# Patient Record
Sex: MALE | Race: OTHER | NOT HISPANIC OR LATINO
[De-identification: names, ages, dates, MRNs, and addresses within clinical notes are randomized per-mention and may not be internally consistent; named-entity substitution may affect disease eponyms.]

---

## 2021-10-04 PROBLEM — Z00.00 ENCOUNTER FOR PREVENTIVE HEALTH EXAMINATION: Status: ACTIVE | Noted: 2021-10-04

## 2021-10-05 ENCOUNTER — APPOINTMENT (OUTPATIENT)
Dept: OTOLARYNGOLOGY | Facility: CLINIC | Age: 71
End: 2021-10-05
Payer: MEDICARE

## 2021-10-05 ENCOUNTER — NON-APPOINTMENT (OUTPATIENT)
Age: 71
End: 2021-10-05

## 2021-10-05 DIAGNOSIS — Z87.898 PERSONAL HISTORY OF OTHER SPECIFIED CONDITIONS: ICD-10-CM

## 2021-10-05 DIAGNOSIS — Z82.49 FAMILY HISTORY OF ISCHEMIC HEART DISEASE AND OTHER DISEASES OF THE CIRCULATORY SYSTEM: ICD-10-CM

## 2021-10-05 DIAGNOSIS — Z86.69 PERSONAL HISTORY OF OTHER DISEASES OF THE NERVOUS SYSTEM AND SENSE ORGANS: ICD-10-CM

## 2021-10-05 DIAGNOSIS — Z86.39 PERSONAL HISTORY OF OTHER ENDOCRINE, NUTRITIONAL AND METABOLIC DISEASE: ICD-10-CM

## 2021-10-05 DIAGNOSIS — Z78.9 OTHER SPECIFIED HEALTH STATUS: ICD-10-CM

## 2021-10-05 DIAGNOSIS — H60.501 UNSPECIFIED ACUTE NONINFECTIVE OTITIS EXTERNA, RIGHT EAR: ICD-10-CM

## 2021-10-05 PROCEDURE — 92557 COMPREHENSIVE HEARING TEST: CPT

## 2021-10-05 PROCEDURE — 92567 TYMPANOMETRY: CPT

## 2021-10-05 PROCEDURE — G0268 REMOVAL OF IMPACTED WAX MD: CPT

## 2021-10-05 PROCEDURE — 99203 OFFICE O/P NEW LOW 30 MIN: CPT | Mod: 25

## 2021-10-05 RX ORDER — OMEPRAZOLE 40 MG/1
CAPSULE, DELAYED RELEASE ORAL
Refills: 0 | Status: ACTIVE | COMMUNITY

## 2021-10-05 RX ORDER — NEOMYCIN AND POLYMYXIN B SULFATES AND HYDROCORTISONE OTIC 10; 3.5; 1 MG/ML; MG/ML; [USP'U]/ML
3.5-10000-1 SUSPENSION AURICULAR (OTIC) TWICE DAILY
Qty: 1 | Refills: 3 | Status: ACTIVE | COMMUNITY
Start: 2021-10-05 | End: 1900-01-01

## 2021-10-05 RX ORDER — SIMVASTATIN 40 MG/1
TABLET, FILM COATED ORAL
Refills: 0 | Status: ACTIVE | COMMUNITY

## 2021-10-05 RX ORDER — ALLOPURINOL 200 MG/1
TABLET ORAL
Refills: 0 | Status: ACTIVE | COMMUNITY

## 2021-10-05 RX ORDER — ENALAPRIL MALEATE 5 MG/1
TABLET ORAL
Refills: 0 | Status: ACTIVE | COMMUNITY

## 2021-10-05 NOTE — HISTORY OF PRESENT ILLNESS
[de-identified] : DESIRE MARKS is a 71 year old male who comes in complaining of Diminished hearing and otalgia in the right ear for several days.He had used drops to try to get out wax. He uses hearing aids in both ears and he finds they are not as helpful and he would like.  He has a history of sleep apnea and uses a CPAP successfully.  The patient had no other ear nose or throat complaints at this visit.

## 2021-10-05 NOTE — ASSESSMENT
[FreeTextEntry1] : It was my impression that the patient had a cerumen impaction that was cleared.  I recommended topical moisturizing.  I recommended avoiding Q-tips.  I reviewed aural hygiene and the role of cerumen with the patient.  I suggested a repeat visit in a year or earlier should the need arise.\par He has a secondary right-sided acute otitis media and I recommended a course of Cortisporin and keeping the ear dry.\par I suggested having his hearing aids evaluated and adjusted as needed.  He is concerned about missing the high-frequency background noises- especially for his fly fishing and  probably would be a good candidate for the ear lens if desired.  this was discussed.

## 2021-10-05 NOTE — CONSULT LETTER
[FreeTextEntry2] : CALVIN HERR\par  [FreeTextEntry1] : \par \par Dear  Dr. CALVIN HERR,\par \par I had the pleasure of seeing your patient today.  \par Please see my note below.\par \par \par Thank you very much for allowing me to participate in the care of your patient.\par \par Sincerely,\par  \par Raheem\par \par \par \par Alex Mooney MD\par [facility]\par [facility]\par  [facility]\par \par

## 2021-10-05 NOTE — PHYSICAL EXAM
[FreeTextEntry1] : \par The patient was alert and oriented and in no distress.\par Voice was clear.\par \par Face:\par The patient had no facial asymmetry or mass.\par The skin was unremarkable other than for a 1 cm right mid lower skin lesion with each central ulceration\par \par Eyes:\par The pupils were equal round and reactive to light and accommodation.\par There was no significant nystagmus or disconjugate gaze noted.\par \par Nose: \par The external nose had no significant deformity.  There was no facial tenderness.  On anterior rhinoscopy, the nasal mucosa was clear.  The anterior septum was midline.  There were no visualized polyps purulence  or masses.\par \par Oral cavity:\par The oral mucosa was normal.\par The oral and base of tongue were clear and without mass.\par The gingival and buccal mucosa were moist and without lesions.\par The palate moved well.\par There was no cleft to the palate.\par There appeared to be good salivary flow.  \par There was no pus, erythema or mass in the oral cavity.\par He had a small posterior airway\par \par Ears:\par  Procedure note:\par  Removal of Cerumen Impactions, both ears:  79090-08\par Both external ears were normal.\par There were symptomatic cerumen impactions in both ears.  These were cleared microscopically without trauma using both suction and curettes.  After clearing,  both ear canals were clear both eardrums were intact and mobile.  \par The right canal was almost hemorrhagic.\par Neck: \par The neck was symmetrical.\par The parotid and submandibular glands were normal without masses.\par The trachea was midline and there was no unusual crepitus.\par The thyroid was smooth and nontender and no masses were palpated.\par There was no significant cervical adenopathy.\par \par \par Neuro:\par Neurologically, the patient was awake, alert, and oriented to person, place and time. There were no obvious focal neurologic abnormalities.  Cranial nerves II through XII were grossly intact.\par \par \par TMJ:\par The temporomandibular joints were nontender.\par There was no abnormal crepitus and no significant malocclusion\par \par  [de-identified] : Audiogram:\par \par Audiometry showed that both ears had normal hearing through 500 hz with high pitched symmetric sensorineural hearing losses above that.  Reflexes were intact and the patient had type A tympanograms.  The audiogram was reviewed with the patient.

## 2022-04-06 ENCOUNTER — APPOINTMENT (OUTPATIENT)
Dept: OTOLARYNGOLOGY | Facility: CLINIC | Age: 72
End: 2022-04-06

## 2022-04-09 ENCOUNTER — OUTPATIENT (OUTPATIENT)
Dept: OUTPATIENT SERVICES | Facility: HOSPITAL | Age: 72
LOS: 1 days | End: 2022-04-09

## 2022-04-09 ENCOUNTER — APPOINTMENT (OUTPATIENT)
Dept: CT IMAGING | Facility: CLINIC | Age: 72
End: 2022-04-09
Payer: MEDICARE

## 2022-04-09 PROCEDURE — 73700 CT LOWER EXTREMITY W/O DYE: CPT | Mod: 26,LT,MH

## 2022-11-09 ENCOUNTER — OUTPATIENT (OUTPATIENT)
Dept: OUTPATIENT SERVICES | Facility: HOSPITAL | Age: 72
LOS: 1 days | End: 2022-11-09
Payer: MEDICARE

## 2022-11-09 ENCOUNTER — APPOINTMENT (OUTPATIENT)
Dept: NUCLEAR MEDICINE | Facility: HOSPITAL | Age: 72
End: 2022-11-09

## 2022-11-09 ENCOUNTER — RESULT REVIEW (OUTPATIENT)
Age: 72
End: 2022-11-09

## 2022-11-09 PROCEDURE — 78315 BONE IMAGING 3 PHASE: CPT | Mod: 26,MH

## 2022-11-09 PROCEDURE — A9503: CPT

## 2022-11-09 PROCEDURE — 78315 BONE IMAGING 3 PHASE: CPT

## 2022-12-19 ENCOUNTER — APPOINTMENT (OUTPATIENT)
Dept: MRI IMAGING | Facility: CLINIC | Age: 72
End: 2022-12-19

## 2022-12-19 ENCOUNTER — RESULT REVIEW (OUTPATIENT)
Age: 72
End: 2022-12-19

## 2022-12-19 ENCOUNTER — OUTPATIENT (OUTPATIENT)
Dept: OUTPATIENT SERVICES | Facility: HOSPITAL | Age: 72
LOS: 1 days | End: 2022-12-19

## 2022-12-19 PROCEDURE — 73721 MRI JNT OF LWR EXTRE W/O DYE: CPT | Mod: 26,LT,MH

## 2023-12-21 ENCOUNTER — APPOINTMENT (OUTPATIENT)
Dept: OTOLARYNGOLOGY | Facility: CLINIC | Age: 73
End: 2023-12-21
Payer: MEDICARE

## 2023-12-21 DIAGNOSIS — H61.23 IMPACTED CERUMEN, BILATERAL: ICD-10-CM

## 2023-12-21 DIAGNOSIS — G47.30 SLEEP APNEA, UNSPECIFIED: ICD-10-CM

## 2023-12-21 DIAGNOSIS — H90.3 SENSORINEURAL HEARING LOSS, BILATERAL: ICD-10-CM

## 2023-12-21 PROCEDURE — 92567 TYMPANOMETRY: CPT

## 2023-12-21 PROCEDURE — V5011: CPT | Mod: GY

## 2023-12-21 PROCEDURE — G0268 REMOVAL OF IMPACTED WAX MD: CPT

## 2023-12-21 PROCEDURE — 99213 OFFICE O/P EST LOW 20 MIN: CPT | Mod: 25

## 2023-12-21 PROCEDURE — 92557 COMPREHENSIVE HEARING TEST: CPT

## 2023-12-21 NOTE — ASSESSMENT
[FreeTextEntry1] : It was my impression that the patient had a cerumen impaction that was cleared.  I recommended topical moisturizing.  I recommended avoiding Q-tips.  I reviewed aural hygiene and the role of cerumen with the patient.  I suggested a repeat visit in a year or earlier should the need arise. He felt that this improved his hearing already  He has the significant sensorineural hearing losses but based on the configuration he should be able to get a good hearing aid result and this was discussed.  I would first try to reprogram his old aids if they are in good enough repair to use

## 2023-12-21 NOTE — CONSULT LETTER
[FreeTextEntry2] : CALVIN HERR [FreeTextEntry1] :   Dear  Dr. CALVIN HERR,  I had the pleasure of seeing your patient today.   Please see my note below.   Thank you very much for allowing me to participate in the care of your patient.  Sincerely,  Raheem Mooney MD [facility]  [facility]

## 2023-12-21 NOTE — PHYSICAL EXAM
[FreeTextEntry1] :  The patient was alert and oriented and in no distress. Voice was clear.  Face: The patient had no facial asymmetry or mass. The skin was unremarkable.  Eyes: The pupils were equal round and reactive to light and accommodation. There was no significant nystagmus or disconjugate gaze noted.  Nose:  The external nose had no significant deformity.  There was no facial tenderness.  On anterior rhinoscopy, the nasal mucosa was clear.  The anterior septum was midline.  There were no visualized polyps purulence  or masses.  He had a skin graft over the nasal dorsum  Oral cavity: The oral mucosa was normal. The oral and base of tongue were clear and without mass. The gingival and buccal mucosa were moist and without lesions. The palate moved well. There was no cleft to the palate. There appeared to be good salivary flow.   There was no pus, erythema or mass in the oral cavity.  Ears:  Procedure note:  Removal of Cerumen Impactions, both ears:  34404-75 Both external ears were normal. There were symptomatic cerumen impactions in both ears.  These were cleared microscopically without trauma using both suction and curettes.  After clearing,  both ear canals were clear both eardrums were intact and mobile.  Neck:  The neck was symmetrical. The parotid and submandibular glands were normal without masses. The trachea was midline and there was no unusual crepitus. The thyroid was smooth and nontender and no masses were palpated. There was no significant cervical adenopathy.   Neuro: Neurologically, the patient was awake, alert, and oriented to person, place and time. There were no obvious focal neurologic abnormalities.  Cranial nerves II through XII were grossly intact.   TMJ: The temporomandibular joints were nontender. There was no abnormal crepitus and no significant malocclusion  [de-identified] : A complete audiogram was ordered, done and reviewed with the patient.  He has the relatively symmetric bilateral sensorineural hearing losses with good discrimination and type a tympanograms without much change from 2 years ago

## 2023-12-21 NOTE — HISTORY OF PRESENT ILLNESS
[de-identified] : DESIRE MARKS is a 73 year old male who comes in complaining of cerumen impactions, hearing losses and finds that his hearing aids are not helping him much.  I had last seen him a little more than 2 years ago.  He also has a history of sleep apnea.  The patient had no other ear nose or throat complaints at this visit.

## 2025-05-21 ENCOUNTER — APPOINTMENT (OUTPATIENT)
Dept: OTOLARYNGOLOGY | Facility: CLINIC | Age: 75
End: 2025-05-21
Payer: COMMERCIAL

## 2025-05-21 ENCOUNTER — APPOINTMENT (OUTPATIENT)
Dept: OTOLARYNGOLOGY | Facility: CLINIC | Age: 75
End: 2025-05-21
Payer: MEDICARE

## 2025-05-21 ENCOUNTER — APPOINTMENT (OUTPATIENT)
Dept: OTOLARYNGOLOGY | Facility: CLINIC | Age: 75
End: 2025-05-21

## 2025-05-21 ENCOUNTER — NON-APPOINTMENT (OUTPATIENT)
Age: 75
End: 2025-05-21

## 2025-05-21 DIAGNOSIS — H61.23 IMPACTED CERUMEN, BILATERAL: ICD-10-CM

## 2025-05-21 DIAGNOSIS — H93.299 OTHER ABNORMAL AUDITORY PERCEPTIONS, UNSPECIFIED EAR: ICD-10-CM

## 2025-05-21 DIAGNOSIS — H90.3 SENSORINEURAL HEARING LOSS, BILATERAL: ICD-10-CM

## 2025-05-21 DIAGNOSIS — J31.0 CHRONIC RHINITIS: ICD-10-CM

## 2025-05-21 DIAGNOSIS — G47.30 SLEEP APNEA, UNSPECIFIED: ICD-10-CM

## 2025-05-21 PROCEDURE — 92550 TYMPANOMETRY & REFLEX THRESH: CPT | Mod: 52

## 2025-05-21 PROCEDURE — 92557 COMPREHENSIVE HEARING TEST: CPT

## 2025-05-21 PROCEDURE — 99213 OFFICE O/P EST LOW 20 MIN: CPT | Mod: 25

## 2025-05-21 PROCEDURE — G0268 REMOVAL OF IMPACTED WAX MD: CPT

## 2025-05-21 PROCEDURE — V5299J: CUSTOM

## 2025-05-22 PROBLEM — H93.299 ABNORMAL AUDITORY PERCEPTION: Status: ACTIVE | Noted: 2025-05-22
